# Patient Record
Sex: MALE | Race: WHITE | Employment: STUDENT | URBAN - METROPOLITAN AREA
[De-identification: names, ages, dates, MRNs, and addresses within clinical notes are randomized per-mention and may not be internally consistent; named-entity substitution may affect disease eponyms.]

---
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
## 2024-09-06 ENCOUNTER — OFFICE VISIT (OUTPATIENT)
Dept: PRIMARY CARE CLINIC | Age: 19
End: 2024-09-06

## 2024-09-06 VITALS
HEIGHT: 68 IN | OXYGEN SATURATION: 98 % | BODY MASS INDEX: 35.16 KG/M2 | WEIGHT: 232 LBS | TEMPERATURE: 97.6 F | HEART RATE: 97 BPM | SYSTOLIC BLOOD PRESSURE: 145 MMHG | RESPIRATION RATE: 16 BRPM | DIASTOLIC BLOOD PRESSURE: 91 MMHG

## 2024-09-06 DIAGNOSIS — J40 SINOBRONCHITIS: Primary | ICD-10-CM

## 2024-09-06 DIAGNOSIS — J32.9 SINOBRONCHITIS: Primary | ICD-10-CM

## 2024-09-06 DIAGNOSIS — R05.9 COUGH, UNSPECIFIED TYPE: ICD-10-CM

## 2024-09-06 LAB — S PYO AG THROAT QL: NORMAL

## 2024-09-06 RX ORDER — BROMPHENIRAMINE MALEATE, PSEUDOEPHEDRINE HYDROCHLORIDE, AND DEXTROMETHORPHAN HYDROBROMIDE 2; 30; 10 MG/5ML; MG/5ML; MG/5ML
10 SYRUP ORAL 4 TIMES DAILY PRN
Qty: 240 ML | Refills: 0 | Status: SHIPPED | OUTPATIENT
Start: 2024-09-06

## 2024-09-06 RX ORDER — CEFDINIR 300 MG/1
300 CAPSULE ORAL 2 TIMES DAILY
Qty: 20 CAPSULE | Refills: 0 | Status: SHIPPED | OUTPATIENT
Start: 2024-09-06 | End: 2024-09-16

## 2024-09-06 ASSESSMENT — ENCOUNTER SYMPTOMS
WHEEZING: 0
EYE PAIN: 0
ABDOMINAL PAIN: 0
SHORTNESS OF BREATH: 0
BACK PAIN: 0
EYE DISCHARGE: 0
NAUSEA: 0
SINUS PRESSURE: 0
SORE THROAT: 1
COUGH: 1
EYE REDNESS: 0
DIARRHEA: 0
VOMITING: 0

## 2024-09-06 NOTE — PROGRESS NOTES
Chief Complaint:   Cough (Past week sinus/allergy took various OTC allergy pills. Congestion )      History of Present Illness   HPI:  Donnell Barboza is a 19 y.o. male who presents to [facility] today for over 1 weeks head and chest congestion; persistent cough    Prior to Visit Medications    Medication Sig Taking? Authorizing Provider   cefdinir (OMNICEF) 300 MG capsule Take 1 capsule by mouth 2 times daily for 10 days Yes Nitin Chand, DO   brompheniramine-pseudoephedrine-DM 2-30-10 MG/5ML syrup Take 10 mLs by mouth 4 times daily as needed for Congestion or Cough Yes Nitin Chand, DO   dicyclomine (BENTYL) 10 MG capsule take 1 capsule by mouth every 6 hours if needed for abdominal pain  ProviderKaren MD   omeprazole (PRILOSEC) 40 MG delayed release capsule   ProviderKaren MD   ibuprofen (ADVIL;MOTRIN) 200 MG tablet Take 600 mg by mouth every 6 hours as needed for Pain  Provider, MD Karen       Review of Systems   Review of Systems   Constitutional:  Negative for chills and fever.   HENT:  Positive for congestion and sore throat. Negative for ear pain and sinus pressure.    Eyes:  Negative for pain, discharge and redness.   Respiratory:  Positive for cough. Negative for shortness of breath and wheezing.    Cardiovascular:  Negative for chest pain.   Gastrointestinal:  Negative for abdominal pain, diarrhea, nausea and vomiting.   Genitourinary:  Negative for dysuria and frequency.   Musculoskeletal:  Negative for arthralgias and back pain.   Skin:  Negative for rash and wound.   Neurological:  Negative for weakness and headaches.   Hematological:  Negative for adenopathy.   Psychiatric/Behavioral: Negative.     All other systems reviewed and are negative.      Patient's medical, social, and family history reviewed    Past Medical History:  has a past medical history of Acid reflux.   Past Surgical History:  has no past surgical history on file.  Social History:  reports that he has

## 2025-04-07 ENCOUNTER — OFFICE VISIT (OUTPATIENT)
Dept: PRIMARY CARE CLINIC | Age: 20
End: 2025-04-07

## 2025-04-07 VITALS
BODY MASS INDEX: 33.1 KG/M2 | WEIGHT: 218.4 LBS | TEMPERATURE: 97.2 F | OXYGEN SATURATION: 98 % | SYSTOLIC BLOOD PRESSURE: 120 MMHG | DIASTOLIC BLOOD PRESSURE: 71 MMHG | RESPIRATION RATE: 16 BRPM | HEART RATE: 96 BPM | HEIGHT: 68 IN

## 2025-04-07 DIAGNOSIS — J01.10 ACUTE NON-RECURRENT FRONTAL SINUSITIS: Primary | ICD-10-CM

## 2025-04-07 PROCEDURE — 99213 OFFICE O/P EST LOW 20 MIN: CPT | Performed by: NURSE PRACTITIONER

## 2025-04-07 RX ORDER — AMOXICILLIN 875 MG/1
875 TABLET, COATED ORAL 2 TIMES DAILY
Qty: 20 TABLET | Refills: 0 | Status: SHIPPED | OUTPATIENT
Start: 2025-04-07 | End: 2025-04-17

## 2025-04-07 RX ORDER — FLUTICASONE PROPIONATE 50 MCG
1 SPRAY, SUSPENSION (ML) NASAL 2 TIMES DAILY
Qty: 16 G | Refills: 0 | Status: SHIPPED | OUTPATIENT
Start: 2025-04-07

## 2025-04-07 NOTE — PROGRESS NOTES
the morning and at bedtime  Dispense: 16 g; Refill: 0    Return if symptoms worsen or fail to improve.    Electronically signed by AMEYA Bonilla CNP   DD: 4/7/25    The patient (or guardian, if applicable) and other individuals in attendance with the patient were advised that Artificial Intelligence will be utilized during this visit to record, process the conversation to generate a clinical note, and support improvement of the AI technology. The patient (or guardian, if applicable) and other individuals in attendance at the appointment consented to the use of AI, including the recording.      **This report was transcribed using voice recognition software. Every effort was made to ensure accuracy; however, inadvertent computerized transcription errors may be present.

## 2025-04-08 ENCOUNTER — OFFICE VISIT (OUTPATIENT)
Dept: PRIMARY CARE CLINIC | Age: 20
End: 2025-04-08

## 2025-04-08 VITALS
TEMPERATURE: 97.8 F | DIASTOLIC BLOOD PRESSURE: 80 MMHG | RESPIRATION RATE: 16 BRPM | HEART RATE: 106 BPM | SYSTOLIC BLOOD PRESSURE: 130 MMHG | HEIGHT: 68 IN | OXYGEN SATURATION: 97 % | BODY MASS INDEX: 33.04 KG/M2 | WEIGHT: 218 LBS

## 2025-04-08 DIAGNOSIS — J01.10 ACUTE NON-RECURRENT FRONTAL SINUSITIS: Primary | ICD-10-CM

## 2025-04-08 DIAGNOSIS — Z20.822 EXPOSURE TO COVID-19 VIRUS: ICD-10-CM

## 2025-04-08 LAB
Lab: NORMAL
PERFORMING INSTRUMENT: NORMAL
QC PASS/FAIL: NORMAL
SARS-COV-2, POC: NORMAL

## 2025-04-08 PROCEDURE — 87426 SARSCOV CORONAVIRUS AG IA: CPT | Performed by: NURSE PRACTITIONER

## 2025-04-08 PROCEDURE — 99213 OFFICE O/P EST LOW 20 MIN: CPT | Performed by: NURSE PRACTITIONER

## 2025-04-08 NOTE — PROGRESS NOTES
Chief Complaint:   exposure to covid (Spent the weekend on a road trip with his mother and today she found out she  Covid-19 positive.)    History of Present Illness   HPI:  Donnell Barboza is a 19 y.o. male who presents to [facility] today for COVID testing. He was seen yesterday for sinusitis, started on amoxicillin which he did start. States his mother recently tested positive for COVID-19. He was with her in the car over the weekend. He is here more just for testing to see if he has COVID.     Prior to Visit Medications    Medication Sig Taking? Authorizing Provider   amoxicillin (AMOXIL) 875 MG tablet Take 1 tablet by mouth 2 times daily for 10 days Yes Gerardo Steven APRN - CNP   fluticasone (FLONASE) 50 MCG/ACT nasal spray 1 spray by Each Nostril route in the morning and at bedtime Yes Gerardo Steven APRN - CNP   dicyclomine (BENTYL) 10 MG capsule take 1 capsule by mouth every 6 hours if needed for abdominal pain Yes ProviderKaren MD   omeprazole (PRILOSEC) 40 MG delayed release capsule  Yes ProviderKaren MD   brompheniramine-pseudoephedrine-DM 2-30-10 MG/5ML syrup Take 10 mLs by mouth 4 times daily as needed for Congestion or Cough  Patient not taking: Reported on 4/7/2025  Nitin Chand DO   ibuprofen (ADVIL;MOTRIN) 200 MG tablet Take 600 mg by mouth every 6 hours as needed for Pain  Patient not taking: Reported on 4/7/2025  Provider, MD Karen     Review of Systems   Unless otherwise stated in this report or unable to obtain because of the patient's clinical or mental status as evidenced by the medical record, this patients's positive and negative responses for Review of Systems, constitutional, psych, eyes, ENT, cardiovascular, respiratory, gastrointestinal, neurological, genitourinary, musculoskeletal, integument systems and systems related to the presenting problem are either stated in the preceding or were not pertinent or were negative for the symptoms and/or